# Patient Record
Sex: FEMALE | Race: AMERICAN INDIAN OR ALASKA NATIVE
[De-identification: names, ages, dates, MRNs, and addresses within clinical notes are randomized per-mention and may not be internally consistent; named-entity substitution may affect disease eponyms.]

---

## 2018-03-14 ENCOUNTER — HOSPITAL ENCOUNTER (EMERGENCY)
Dept: HOSPITAL 5 - ED | Age: 18
LOS: 1 days | Discharge: HOME | End: 2018-03-15
Payer: COMMERCIAL

## 2018-03-14 VITALS — SYSTOLIC BLOOD PRESSURE: 114 MMHG | DIASTOLIC BLOOD PRESSURE: 78 MMHG

## 2018-03-14 DIAGNOSIS — M54.9: Primary | ICD-10-CM

## 2018-03-14 DIAGNOSIS — Z88.8: ICD-10-CM

## 2018-03-14 DIAGNOSIS — M54.2: ICD-10-CM

## 2018-03-14 LAB
BILIRUB UR QL STRIP: (no result)
BLOOD UR QL VISUAL: (no result)
HGB S BLD QL SOLY: (no result)
HYALINE CASTS #/AREA URNS LPF: 1 /LPF
MUCOUS THREADS #/AREA URNS HPF: (no result) /HPF
PH UR STRIP: 7 [PH] (ref 5–7)
PROT UR STRIP-MCNC: (no result) MG/DL
RBC #/AREA URNS HPF: 23 /HPF (ref 0–6)
UROBILINOGEN UR-MCNC: 2 MG/DL (ref ?–2)
WBC #/AREA URNS HPF: 4 /HPF (ref 0–6)

## 2018-03-14 PROCEDURE — 99283 EMERGENCY DEPT VISIT LOW MDM: CPT

## 2018-03-14 PROCEDURE — 81001 URINALYSIS AUTO W/SCOPE: CPT
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
